# Patient Record
Sex: MALE | Race: OTHER | Employment: OTHER | URBAN - METROPOLITAN AREA
[De-identification: names, ages, dates, MRNs, and addresses within clinical notes are randomized per-mention and may not be internally consistent; named-entity substitution may affect disease eponyms.]

---

## 2021-02-17 NOTE — PATIENT DISCUSSION
New Prescription: prednisoln yp-tlqluptk-gtpkulb (prednisoln us-pxkeyrxd-odtwxoa): drops: 1-0.5-0.075% 1 drop three times a day as directed into right eye 02-

## 2021-02-17 NOTE — PATIENT DISCUSSION
CATARACT, OD - VISUALLY SIGNIFICANT. SCHEDULE PHACO WITH IOL OD. GLASSES RX TO BE GIVEN BY [facility] TO FILL IF DESIRES IN THE EVENT PATIENT DOES NOT PROCEED WITH SURGERY.

## 2021-03-19 NOTE — PATIENT DISCUSSION
S/P PHACO W/IOL, OD: GOOD POST OP RESULT. CONTINUE TO TAPER DROPS AS DIRECTED BY [facility]. OKAY TO USE REFRESH PF OPTIVE PRN. FOLLOW-UP WITH [facility] IN ONE WEEK.

## 2021-03-19 NOTE — PATIENT DISCUSSION
Post-Op Instructions: The patient was instructed in the proper use of post-operative eye drops. Taper combination drops as directed. Call back instructions, retinal detachment and endophthalmitis precautions given.

## 2021-03-19 NOTE — PATIENT DISCUSSION
Continue: prednisoln ff-kxlolipl-bavydjh (prednisoln vw-xtpvawwr-pepylcy): drops: 1-0.5-0.075% 1 drop three times a day as directed into right eye 02-

## 2022-07-21 ENCOUNTER — NEW PATIENT (OUTPATIENT)
Dept: URBAN - METROPOLITAN AREA CLINIC 4 | Facility: CLINIC | Age: 67
End: 2022-07-21

## 2022-07-21 DIAGNOSIS — H33.312: ICD-10-CM

## 2022-07-21 PROCEDURE — 92134 CPTRZ OPH DX IMG PST SGM RTA: CPT

## 2022-07-21 PROCEDURE — 67145 PROPH RTA DTCHMNT PC: CPT

## 2022-07-21 PROCEDURE — 99204 OFFICE O/P NEW MOD 45 MIN: CPT

## 2022-07-21 ASSESSMENT — VISUAL ACUITY
OS_CC: 20/40-2
OS_PH: 20/40
OD_CC: 20/50

## 2022-07-21 ASSESSMENT — TONOMETRY
OD_IOP_MMHG: 17
OS_IOP_MMHG: 15

## 2022-08-04 ENCOUNTER — ESTABLISHED PATIENT (OUTPATIENT)
Dept: URBAN - METROPOLITAN AREA CLINIC 4 | Facility: CLINIC | Age: 67
End: 2022-08-04

## 2022-08-04 DIAGNOSIS — H33.312: ICD-10-CM

## 2022-08-04 PROCEDURE — 99024 POSTOP FOLLOW-UP VISIT: CPT

## 2022-08-04 ASSESSMENT — TONOMETRY
OS_IOP_MMHG: 15
OD_IOP_MMHG: 14

## 2022-08-04 ASSESSMENT — VISUAL ACUITY
OS_CC: 20/25-2
OD_CC: 20/30

## 2023-08-15 ENCOUNTER — ESTABLISHED PATIENT (OUTPATIENT)
Dept: URBAN - METROPOLITAN AREA CLINIC 4 | Facility: CLINIC | Age: 68
End: 2023-08-15

## 2023-08-15 DIAGNOSIS — H33.313: ICD-10-CM

## 2023-08-15 DIAGNOSIS — H43.813: ICD-10-CM

## 2023-08-15 PROCEDURE — 67145 PROPH RTA DTCHMNT PC: CPT

## 2023-08-15 PROCEDURE — 92134 CPTRZ OPH DX IMG PST SGM RTA: CPT

## 2023-08-15 PROCEDURE — 92014 COMPRE OPH EXAM EST PT 1/>: CPT

## 2023-08-15 ASSESSMENT — TONOMETRY
OS_IOP_MMHG: 14
OD_IOP_MMHG: 13

## 2023-08-15 ASSESSMENT — VISUAL ACUITY
OD_CC: 20/40-1
OU_CC: 20/30+1
OS_CC: 20/40+1

## 2023-08-29 ENCOUNTER — ESTABLISHED PATIENT (OUTPATIENT)
Dept: URBAN - METROPOLITAN AREA CLINIC 4 | Facility: CLINIC | Age: 68
End: 2023-08-29

## 2023-08-29 DIAGNOSIS — H43.813: ICD-10-CM

## 2023-08-29 DIAGNOSIS — H33.313: ICD-10-CM

## 2023-08-29 PROCEDURE — 92134 CPTRZ OPH DX IMG PST SGM RTA: CPT

## 2023-08-29 PROCEDURE — 92014 COMPRE OPH EXAM EST PT 1/>: CPT

## 2023-08-29 ASSESSMENT — VISUAL ACUITY
OD_CC: 20/30
OS_CC: 20/30-1

## 2023-08-29 ASSESSMENT — TONOMETRY
OD_IOP_MMHG: 15
OS_IOP_MMHG: 13

## 2023-09-15 ENCOUNTER — ESTABLISHED PATIENT (OUTPATIENT)
Dept: URBAN - METROPOLITAN AREA CLINIC 11 | Facility: CLINIC | Age: 68
End: 2023-09-15

## 2023-09-15 DIAGNOSIS — H43.813: ICD-10-CM

## 2023-09-15 DIAGNOSIS — H35.373: ICD-10-CM

## 2023-09-15 DIAGNOSIS — H33.021: ICD-10-CM

## 2023-09-15 PROCEDURE — 92134 CPTRZ OPH DX IMG PST SGM RTA: CPT

## 2023-09-15 PROCEDURE — 92201 OPSCPY EXTND RTA DRAW UNI/BI: CPT

## 2023-09-15 PROCEDURE — 99214 OFFICE O/P EST MOD 30 MIN: CPT

## 2023-09-15 ASSESSMENT — VISUAL ACUITY
OS_CC: 20/40
OD_CC: 20/40

## 2023-09-15 ASSESSMENT — TONOMETRY
OD_IOP_MMHG: 14
OS_IOP_MMHG: 15

## 2023-09-19 ENCOUNTER — POST-OP (OUTPATIENT)
Dept: URBAN - METROPOLITAN AREA CLINIC 18 | Facility: CLINIC | Age: 68
End: 2023-09-19

## 2023-09-19 DIAGNOSIS — H35.373: ICD-10-CM

## 2023-09-19 DIAGNOSIS — Z98.890: ICD-10-CM

## 2023-09-19 DIAGNOSIS — H43.812: ICD-10-CM

## 2023-09-19 DIAGNOSIS — H33.312: ICD-10-CM

## 2023-09-19 PROCEDURE — 99024 POSTOP FOLLOW-UP VISIT: CPT

## 2023-09-19 ASSESSMENT — TONOMETRY
OS_IOP_MMHG: 12
OD_IOP_MMHG: 15

## 2023-09-19 ASSESSMENT — VISUAL ACUITY: OS_SC: 20/40

## 2023-10-10 ENCOUNTER — POST-OP (OUTPATIENT)
Dept: URBAN - METROPOLITAN AREA CLINIC 18 | Facility: CLINIC | Age: 68
End: 2023-10-10

## 2023-10-10 DIAGNOSIS — H43.812: ICD-10-CM

## 2023-10-10 DIAGNOSIS — H33.312: ICD-10-CM

## 2023-10-10 DIAGNOSIS — Z98.890: ICD-10-CM

## 2023-10-10 DIAGNOSIS — H35.373: ICD-10-CM

## 2023-10-10 PROCEDURE — 92134 CPTRZ OPH DX IMG PST SGM RTA: CPT

## 2023-10-10 PROCEDURE — 99024 POSTOP FOLLOW-UP VISIT: CPT

## 2023-10-10 ASSESSMENT — VISUAL ACUITY
OD_CC: 20/70-2
OS_CC: 20/25-2

## 2023-10-10 ASSESSMENT — TONOMETRY
OD_IOP_MMHG: 25
OS_IOP_MMHG: 18

## 2023-11-16 ENCOUNTER — NEW PATIENT (OUTPATIENT)
Dept: URBAN - METROPOLITAN AREA CLINIC 18 | Facility: CLINIC | Age: 68
End: 2023-11-16

## 2023-11-16 DIAGNOSIS — H52.13: ICD-10-CM

## 2023-11-16 DIAGNOSIS — Z98.890: ICD-10-CM

## 2023-11-16 DIAGNOSIS — H35.373: ICD-10-CM

## 2023-11-16 DIAGNOSIS — H43.811: ICD-10-CM

## 2023-11-16 DIAGNOSIS — H43.812: ICD-10-CM

## 2023-11-16 DIAGNOSIS — H33.312: ICD-10-CM

## 2023-11-16 PROCEDURE — 92015 DETERMINE REFRACTIVE STATE: CPT

## 2023-11-16 PROCEDURE — 92014 COMPRE OPH EXAM EST PT 1/>: CPT

## 2023-11-16 PROCEDURE — 92134 CPTRZ OPH DX IMG PST SGM RTA: CPT

## 2023-11-16 ASSESSMENT — KERATOMETRY
OS_K1POWER_DIOPTERS: 41.75
OS_K2POWER_DIOPTERS: 45.25
OD_K2POWER_DIOPTERS: 44.25
OD_K1POWER_DIOPTERS: 41.75

## 2023-11-16 ASSESSMENT — TONOMETRY
OS_IOP_MMHG: 12
OD_IOP_MMHG: 12

## 2023-11-16 ASSESSMENT — VISUAL ACUITY
OS_CC: 20/25+2
OS_SC: 20/200
OD_SC: CF 6FT
OD_CC: 20/70
OD_PH: 20/50+1

## 2023-11-29 ASSESSMENT — KERATOMETRY
OD_K1POWER_DIOPTERS: 41.75
OS_K1POWER_DIOPTERS: 41.75
OD_K2POWER_DIOPTERS: 44.25
OS_K2POWER_DIOPTERS: 45.25

## 2023-11-30 ENCOUNTER — ESTABLISHED PATIENT (OUTPATIENT)
Dept: URBAN - METROPOLITAN AREA CLINIC 18 | Facility: CLINIC | Age: 68
End: 2023-11-30

## 2023-11-30 DIAGNOSIS — H10.45: ICD-10-CM

## 2023-11-30 DIAGNOSIS — H52.32: ICD-10-CM

## 2023-11-30 DIAGNOSIS — H35.373: ICD-10-CM

## 2023-11-30 DIAGNOSIS — H43.811: ICD-10-CM

## 2023-11-30 DIAGNOSIS — H52.13: ICD-10-CM

## 2023-11-30 PROCEDURE — 92012 INTRM OPH EXAM EST PATIENT: CPT

## 2023-11-30 PROCEDURE — 92134 CPTRZ OPH DX IMG PST SGM RTA: CPT

## 2023-11-30 RX ORDER — LOTEPREDNOL ETABONATE 3.8 MG/G: 1 GEL OPHTHALMIC

## 2023-11-30 ASSESSMENT — VISUAL ACUITY
OS_PH: 20/40+2
OU_CC: 20/25
OS_CC: 20/50-1
OD_CC: 20/50+2

## 2023-12-21 ENCOUNTER — FOLLOW UP (OUTPATIENT)
Dept: URBAN - METROPOLITAN AREA CLINIC 18 | Facility: CLINIC | Age: 68
End: 2023-12-21

## 2023-12-21 DIAGNOSIS — H43.811: ICD-10-CM

## 2023-12-21 DIAGNOSIS — H10.45: ICD-10-CM

## 2023-12-21 DIAGNOSIS — H52.13: ICD-10-CM

## 2023-12-21 PROCEDURE — 92012 INTRM OPH EXAM EST PATIENT: CPT

## 2023-12-21 ASSESSMENT — VISUAL ACUITY
OD_CC: 20/30-1
OS_CC: 20/30-2

## 2023-12-21 ASSESSMENT — TONOMETRY
OD_IOP_MMHG: 10
OS_IOP_MMHG: 10

## 2023-12-28 ASSESSMENT — KERATOMETRY
OD_K2POWER_DIOPTERS: 44.00
OD_AXISANGLE2_DEGREES: 88
OD_K1POWER_DIOPTERS: 42.00
OD_AXISANGLE_DEGREES: 178
OS_K2POWER_DIOPTERS: 46.25
OS_AXISANGLE_DEGREES: 176
OS_AXISANGLE2_DEGREES: 86
OS_K1POWER_DIOPTERS: 41.75

## 2024-01-10 ASSESSMENT — KERATOMETRY
OD_K2POWER_DIOPTERS: 44.00
OS_AXISANGLE2_DEGREES: 86
OD_AXISANGLE2_DEGREES: 88
OS_AXISANGLE_DEGREES: 176
OS_K2POWER_DIOPTERS: 46.25
OS_K1POWER_DIOPTERS: 41.75
OD_K1POWER_DIOPTERS: 42.00
OD_AXISANGLE_DEGREES: 178

## 2024-01-11 ENCOUNTER — ESTABLISHED PATIENT (OUTPATIENT)
Dept: URBAN - METROPOLITAN AREA CLINIC 18 | Facility: CLINIC | Age: 69
End: 2024-01-11

## 2024-01-11 DIAGNOSIS — H35.373: ICD-10-CM

## 2024-01-11 DIAGNOSIS — H33.012: ICD-10-CM

## 2024-01-11 DIAGNOSIS — H52.13: ICD-10-CM

## 2024-01-11 PROCEDURE — 92134 CPTRZ OPH DX IMG PST SGM RTA: CPT

## 2024-01-11 PROCEDURE — 99214 OFFICE O/P EST MOD 30 MIN: CPT

## 2024-01-11 ASSESSMENT — VISUAL ACUITY
OD_CC: 20/30-2
OS_CC: 20/30+2

## 2024-01-11 ASSESSMENT — TONOMETRY
OS_IOP_MMHG: 18
OD_IOP_MMHG: 17

## 2024-01-12 ENCOUNTER — ESTABLISHED PATIENT (OUTPATIENT)
Dept: URBAN - METROPOLITAN AREA CLINIC 11 | Facility: CLINIC | Age: 69
End: 2024-01-12

## 2024-01-12 DIAGNOSIS — H33.312: ICD-10-CM

## 2024-01-12 DIAGNOSIS — H33.002: ICD-10-CM

## 2024-01-12 DIAGNOSIS — H35.373: ICD-10-CM

## 2024-01-12 DIAGNOSIS — H43.812: ICD-10-CM

## 2024-01-12 DIAGNOSIS — Z98.890: ICD-10-CM

## 2024-01-12 PROCEDURE — 92134 CPTRZ OPH DX IMG PST SGM RTA: CPT

## 2024-01-12 PROCEDURE — 92201 OPSCPY EXTND RTA DRAW UNI/BI: CPT

## 2024-01-12 PROCEDURE — 99214 OFFICE O/P EST MOD 30 MIN: CPT

## 2024-01-12 ASSESSMENT — VISUAL ACUITY
OU_SC: 20/30-2
OD_SC: 20/40-2
OS_SC: 20/30-2

## 2024-01-12 ASSESSMENT — KERATOMETRY
OS_K2POWER_DIOPTERS: 46.25
OD_K1POWER_DIOPTERS: 42.00
OS_K1POWER_DIOPTERS: 41.75
OD_K2POWER_DIOPTERS: 44.00
OS_AXISANGLE_DEGREES: 176
OD_AXISANGLE_DEGREES: 178
OS_AXISANGLE2_DEGREES: 86
OD_AXISANGLE2_DEGREES: 88

## 2024-01-12 ASSESSMENT — TONOMETRY
OS_IOP_MMHG: 16
OD_IOP_MMHG: 21

## 2024-01-15 ENCOUNTER — SURGERY/PROCEDURE (OUTPATIENT)
Dept: URBAN - METROPOLITAN AREA SURGERY 6 | Facility: SURGERY | Age: 69
End: 2024-01-15

## 2024-01-15 DIAGNOSIS — H33.002: ICD-10-CM

## 2024-01-15 PROCEDURE — 67108 REPAIR DETACHED RETINA: CPT

## 2024-01-16 ENCOUNTER — POST-OP (OUTPATIENT)
Dept: URBAN - METROPOLITAN AREA CLINIC 18 | Facility: CLINIC | Age: 69
End: 2024-01-16

## 2024-01-16 DIAGNOSIS — H33.312: ICD-10-CM

## 2024-01-16 DIAGNOSIS — H35.373: ICD-10-CM

## 2024-01-16 DIAGNOSIS — Z98.890: ICD-10-CM

## 2024-01-16 PROCEDURE — 99024 POSTOP FOLLOW-UP VISIT: CPT

## 2024-01-16 ASSESSMENT — VISUAL ACUITY
OU_CC: 20/40-1
OD_CC: 20/40-1

## 2024-01-16 ASSESSMENT — KERATOMETRY
OS_K2POWER_DIOPTERS: 46.25
OD_AXISANGLE_DEGREES: 178
OD_AXISANGLE2_DEGREES: 88
OD_K2POWER_DIOPTERS: 44.00
OS_AXISANGLE_DEGREES: 176
OS_K1POWER_DIOPTERS: 41.75
OS_AXISANGLE2_DEGREES: 86
OD_K1POWER_DIOPTERS: 42.00

## 2024-01-16 ASSESSMENT — TONOMETRY
OD_IOP_MMHG: 14
OS_IOP_MMHG: 18

## 2024-01-22 ASSESSMENT — KERATOMETRY
OS_AXISANGLE_DEGREES: 176
OS_K1POWER_DIOPTERS: 41.75
OS_AXISANGLE2_DEGREES: 86
OS_K2POWER_DIOPTERS: 46.25
OD_K1POWER_DIOPTERS: 42.00
OD_K2POWER_DIOPTERS: 44.00
OD_AXISANGLE2_DEGREES: 88
OD_AXISANGLE_DEGREES: 178

## 2024-02-06 ENCOUNTER — POST-OP (OUTPATIENT)
Dept: URBAN - METROPOLITAN AREA CLINIC 18 | Facility: CLINIC | Age: 69
End: 2024-02-06

## 2024-02-06 DIAGNOSIS — H35.373: ICD-10-CM

## 2024-02-06 DIAGNOSIS — Z98.890: ICD-10-CM

## 2024-02-06 DIAGNOSIS — H33.312: ICD-10-CM

## 2024-02-06 PROCEDURE — 92134 CPTRZ OPH DX IMG PST SGM RTA: CPT

## 2024-02-06 PROCEDURE — 99024 POSTOP FOLLOW-UP VISIT: CPT

## 2024-02-06 ASSESSMENT — VISUAL ACUITY
OS_CC: 20/40-2
OD_CC: 20/40

## 2024-02-06 ASSESSMENT — KERATOMETRY
OS_K2POWER_DIOPTERS: 46.25
OS_K1POWER_DIOPTERS: 41.75
OD_AXISANGLE_DEGREES: 178
OD_K2POWER_DIOPTERS: 44.00
OD_K1POWER_DIOPTERS: 42.00
OS_AXISANGLE_DEGREES: 176
OD_AXISANGLE2_DEGREES: 88
OS_AXISANGLE2_DEGREES: 86

## 2024-02-06 ASSESSMENT — TONOMETRY
OS_IOP_MMHG: 18
OD_IOP_MMHG: 14

## 2024-05-06 ASSESSMENT — KERATOMETRY
OD_K1POWER_DIOPTERS: 42.00
OD_K2POWER_DIOPTERS: 44.00
OD_AXISANGLE2_DEGREES: 88
OS_AXISANGLE_DEGREES: 176
OS_K1POWER_DIOPTERS: 41.75
OS_K2POWER_DIOPTERS: 46.25
OD_AXISANGLE_DEGREES: 178
OS_AXISANGLE2_DEGREES: 86

## 2024-05-07 ENCOUNTER — FOLLOW UP (OUTPATIENT)
Dept: URBAN - METROPOLITAN AREA CLINIC 18 | Facility: CLINIC | Age: 69
End: 2024-05-07

## 2024-05-07 DIAGNOSIS — H25.13: ICD-10-CM

## 2024-05-07 DIAGNOSIS — H33.312: ICD-10-CM

## 2024-05-07 DIAGNOSIS — Z98.890: ICD-10-CM

## 2024-05-07 DIAGNOSIS — H35.373: ICD-10-CM

## 2024-05-07 PROCEDURE — 99213 OFFICE O/P EST LOW 20 MIN: CPT

## 2024-05-07 PROCEDURE — 92134 CPTRZ OPH DX IMG PST SGM RTA: CPT

## 2024-05-07 ASSESSMENT — VISUAL ACUITY
OD_CC: 20/200-1
OS_CC: 20/100
OD_PH: 20/40
OS_PH: 20/40-1

## 2024-05-07 ASSESSMENT — TONOMETRY
OS_IOP_MMHG: 13
OD_IOP_MMHG: 14

## 2024-06-03 ENCOUNTER — PRE-OP/H&P (OUTPATIENT)
Facility: LOCATION | Age: 69
End: 2024-06-03

## 2024-06-03 DIAGNOSIS — H25.13: ICD-10-CM

## 2024-06-03 PROCEDURE — 92136 OPHTHALMIC BIOMETRY: CPT

## 2024-06-03 PROCEDURE — 99211PRE PRE OP VISIT

## 2024-06-03 ASSESSMENT — VISUAL ACUITY
OD_GLARE: 20/400
OS_GLARE: 20/100
OD_CC: 20/400
OU_CC: 20/400
OS_CC: 20/400+1

## 2024-06-03 ASSESSMENT — TONOMETRY
OD_IOP_MMHG: 14
OS_IOP_MMHG: 14

## 2024-07-16 ENCOUNTER — SURGERY/PROCEDURE (OUTPATIENT)
Dept: URBAN - METROPOLITAN AREA SURGERY 6 | Facility: SURGERY | Age: 69
End: 2024-07-16

## 2024-07-16 DIAGNOSIS — H25.13: ICD-10-CM

## 2024-07-16 PROBLEM — Z96.1: Noted: 2024-07-16

## 2024-07-16 PROCEDURE — 66984CV REMOVE CATARACT, INSERT LENS, CUSTOM VISION

## 2024-07-16 PROCEDURE — 99199PCV PROF CUSTOM VISION PACKAGE

## 2024-07-17 ENCOUNTER — POST-OP (OUTPATIENT)
Facility: LOCATION | Age: 69
End: 2024-07-17

## 2024-07-17 DIAGNOSIS — Z96.1: ICD-10-CM

## 2024-07-17 PROCEDURE — 99024 POSTOP FOLLOW-UP VISIT: CPT

## 2024-07-17 ASSESSMENT — TONOMETRY: OD_IOP_MMHG: 18

## 2024-07-17 ASSESSMENT — VISUAL ACUITY: OD_SC: 20/40

## 2024-07-29 ENCOUNTER — POST OP/EVAL OF SECOND EYE (OUTPATIENT)
Facility: LOCATION | Age: 69
End: 2024-07-29

## 2024-07-29 DIAGNOSIS — H25.12: ICD-10-CM

## 2024-07-29 DIAGNOSIS — Z96.1: ICD-10-CM

## 2024-07-29 ASSESSMENT — VISUAL ACUITY: OD_SC: 20/25-1

## 2024-07-29 ASSESSMENT — TONOMETRY: OD_IOP_MMHG: 6

## 2024-08-20 ENCOUNTER — SURGERY/PROCEDURE (OUTPATIENT)
Dept: URBAN - METROPOLITAN AREA SURGERY 6 | Facility: SURGERY | Age: 69
End: 2024-08-20

## 2024-08-20 DIAGNOSIS — H25.13: ICD-10-CM

## 2024-08-20 PROBLEM — Z96.1: Noted: 2024-08-20

## 2024-08-20 PROCEDURE — 66984CV REMOVE CATARACT, INSERT LENS, CUSTOM VISION: Mod: 79,LT

## 2024-08-20 PROCEDURE — 99199PCV PROF CUSTOM VISION PACKAGE

## 2024-08-21 ENCOUNTER — POST-OP (OUTPATIENT)
Facility: LOCATION | Age: 69
End: 2024-08-21

## 2024-08-21 DIAGNOSIS — Z96.1: ICD-10-CM

## 2024-08-21 PROCEDURE — 99024 POSTOP FOLLOW-UP VISIT: CPT

## 2024-08-21 ASSESSMENT — TONOMETRY: OS_IOP_MMHG: 20

## 2024-08-21 ASSESSMENT — VISUAL ACUITY: OS_SC: 20/30-2

## 2024-09-06 ENCOUNTER — POST-OP (OUTPATIENT)
Facility: LOCATION | Age: 69
End: 2024-09-06

## 2024-09-06 DIAGNOSIS — Z96.1: ICD-10-CM

## 2024-09-06 PROCEDURE — 99024 POSTOP FOLLOW-UP VISIT: CPT

## 2024-09-19 ENCOUNTER — COMPREHENSIVE EXAM (OUTPATIENT)
Dept: URBAN - METROPOLITAN AREA CLINIC 11 | Facility: CLINIC | Age: 69
End: 2024-09-19

## 2024-09-19 DIAGNOSIS — H33.312: ICD-10-CM

## 2024-09-19 DIAGNOSIS — H53.9: ICD-10-CM

## 2024-09-19 DIAGNOSIS — Z98.890: ICD-10-CM

## 2024-09-19 DIAGNOSIS — H35.373: ICD-10-CM

## 2024-09-19 DIAGNOSIS — H52.13: ICD-10-CM

## 2024-09-19 PROCEDURE — 99214 OFFICE O/P EST MOD 30 MIN: CPT

## 2024-09-19 PROCEDURE — 92201 OPSCPY EXTND RTA DRAW UNI/BI: CPT

## 2024-09-19 PROCEDURE — 92134 CPTRZ OPH DX IMG PST SGM RTA: CPT

## 2024-11-12 ENCOUNTER — COMPREHENSIVE EXAM (OUTPATIENT)
Dept: URBAN - METROPOLITAN AREA CLINIC 18 | Facility: CLINIC | Age: 69
End: 2024-11-12

## 2024-11-12 DIAGNOSIS — H35.373: ICD-10-CM

## 2024-11-12 DIAGNOSIS — H31.003: ICD-10-CM

## 2024-11-12 DIAGNOSIS — Z98.890: ICD-10-CM

## 2024-11-12 DIAGNOSIS — H33.312: ICD-10-CM

## 2024-11-12 PROCEDURE — 92201 OPSCPY EXTND RTA DRAW UNI/BI: CPT

## 2024-11-12 PROCEDURE — 92014 COMPRE OPH EXAM EST PT 1/>: CPT

## 2024-11-12 PROCEDURE — 92134 CPTRZ OPH DX IMG PST SGM RTA: CPT
